# Patient Record
Sex: FEMALE | Race: WHITE | NOT HISPANIC OR LATINO | Employment: FULL TIME | URBAN - METROPOLITAN AREA
[De-identification: names, ages, dates, MRNs, and addresses within clinical notes are randomized per-mention and may not be internally consistent; named-entity substitution may affect disease eponyms.]

---

## 2023-07-27 ENCOUNTER — TELEPHONE (OUTPATIENT)
Dept: GASTROENTEROLOGY | Facility: CLINIC | Age: 63
End: 2023-07-27

## 2023-07-27 NOTE — TELEPHONE ENCOUNTER
Pt returned call and confirmed appt for 7/31/2023. Pt does not wish to schedule another appt at this time until seen on Monday.

## 2023-07-27 NOTE — TELEPHONE ENCOUNTER
Left a voice mail for the patient to call back and schedule her 2 to 3 week [place] discharge appt with RICKI Inman

## 2023-07-31 ENCOUNTER — TELEPHONE (OUTPATIENT)
Dept: GASTROENTEROLOGY | Facility: CLINIC | Age: 63
End: 2023-07-31

## 2023-07-31 ENCOUNTER — OFFICE VISIT (OUTPATIENT)
Dept: GASTROENTEROLOGY | Facility: CLINIC | Age: 63
End: 2023-07-31
Payer: COMMERCIAL

## 2023-07-31 VITALS
SYSTOLIC BLOOD PRESSURE: 100 MMHG | HEIGHT: 61 IN | WEIGHT: 118 LBS | BODY MASS INDEX: 22.28 KG/M2 | DIASTOLIC BLOOD PRESSURE: 70 MMHG

## 2023-07-31 DIAGNOSIS — Z87.19 HISTORY OF COLONIC DIVERTICULITIS: Primary | ICD-10-CM

## 2023-07-31 PROBLEM — Z12.11 COLON CANCER SCREENING: Status: ACTIVE | Noted: 2023-07-31

## 2023-07-31 PROCEDURE — 99204 OFFICE O/P NEW MOD 45 MIN: CPT | Performed by: PHYSICIAN ASSISTANT

## 2023-07-31 RX ORDER — ABALOPARATIDE 2000 UG/ML
INJECTION, SOLUTION SUBCUTANEOUS
COMMUNITY
Start: 2023-07-13

## 2023-07-31 RX ORDER — MULTIVITAMIN
1 TABLET ORAL DAILY
COMMUNITY

## 2023-07-31 RX ORDER — VITAMIN B COMPLEX
1 CAPSULE ORAL DAILY
COMMUNITY

## 2023-07-31 NOTE — TELEPHONE ENCOUNTER
Scheduled date of colonoscopy (as of today):10-05-23  Physician performing colonoscopy:ayan  Location of colonoscopy:[facility]  Bowel prep reviewed with patient:Clenpiq  SAMPLE GIVEN  Instructions reviewed with patient by:lemuel  Clearances: no

## 2023-07-31 NOTE — PATIENT INSTRUCTIONS
Fiber Supplementation Instructions     Insufficient fiber in the diet can lead to constipation which may result in the development of hemorrhoids and/or anal fissures. Increased fiber intake has been shown to reduce constipation by softening the stool and increasing the regularity of bowel movements. Soluble fiber (such as Benefiber®) is easy to incorporate into your diet as it is tasteless and can be mixed with food or drink. Begin with 1 tbsp once a day and then gradually increase to 1 tbsp 3 times a day over the period of a few days. Stir Benefiber® into 4-8 ounces of liquid (carbonated beverages are not recommended) or mix with soft food (hot or cold). Stir well until dissolved. Increase your fluid intake as necessary to ensure you are drinking 7-8 glasses of water every day. Supplemental fiber should be taken with meals for greatest benefit.  Many less-expensive generic versions of Benefiber® are available with similar active components (pictured below)

## 2023-07-31 NOTE — PROGRESS NOTES
[address] [place] [address] [facility] - Outpatient Follow-up Note  Rani Snell 61 y.o. female MRN: [id number]  Encounter: [id number]    ASSESSMENT AND PLAN:    1. History of colonic diverticulitis  · Acute episode resolved, overall feeling better. · Recommend colonoscopy to r/o colonic neoplasm that could contribute to acute diverticulitis episode. Procedure risks and preparation discussed in detail. No contraindications to perform procedure at [facility]. · Slowly continue to advance diet to high fiber - handout provided. Discussed role of supplementary fiber  Patient instructed to return if symptoms worsen or fail to improve. - Colonoscopy; Future  - sodium picosulfate, magnesium oxide, citric acid (Clenpiq) oral solution; Take 175 mL (1 bottle) the evening before the colonoscopy, between 5 PM and 9 PM, followed by a second 175 mL bottle 5 hours before the colonoscopy. Dispense: 350 mL; Refill: 0      Follow up appointment: for colonoscopy  ______________________________________________________________________    Chief Complaint   Patient presents with   • f/u diverticulitis       HPI:   Accompanied by self and history is obtained from self. Patient is a 61 y.o. female with a significant PMH of diverticulitis, GERD, hypothyroidism presenting for follow up regarding [address] [place] [address] admission 7/21-25 for second episode of acute diverticulitis. HPI    Admitted to [address] [place] [address] 7/21-25 for L-sided abdominal pain  CT A/P showed "acute diverticulitis in mid descending colon with surrounding stranding and small amount of fluid extending posterior medially into the lateral aspect of left Gerota's fascia"  Started on IV Zosyn in the hospital, d/c to home on Augmentin for 6 additional days to complete 10 total days of abx - last dose today 7/31  Last colonoscopy 2015    Overall, feeling better.  Still somewhat tired  Some gas pains this AM - intermittent since home  Improved after loose BM today  BMs have been soft - no constipation  Is adhering to low-residue diet and tolerating that well to date  Occasional nausea, resolves on its own  Has started adding psyllium husk in oatmeal    GI History:  Previous issues: as above  Blood thinners: ASA: no antiplatelet: no anticoagulation: no  Insulin use: no    Abdominal Surgical Hx: liver bx 7/24/23 - pathology pending  Family Hx: Denies first degree relatives with GI malignancies. GI procedure Hx:  EGD: 10+ yrs ago  Colonoscopy: 2015  GI imaging Hx: as above    Review of Systems   Constitutional: Negative for appetite change, fever and unexpected weight change. HENT: Negative for dental problem, mouth sores, sore throat, trouble swallowing and voice change. Respiratory: Negative for cough and choking. Cardiovascular: Negative for chest pain and leg swelling. Gastrointestinal: Positive for abdominal pain (intermittent, overall improving) and nausea (intermittent). Negative for abdominal distention, anal bleeding, blood in stool, constipation, diarrhea, rectal pain and vomiting. Genitourinary: Negative for dysuria, frequency and hematuria. Skin: Negative for pallor and rash. Neurological: Positive for headaches (migraines). Negative for weakness and light-headedness. Psychiatric/Behavioral: Negative for confusion. The patient is not nervous/anxious.         Historical Information   Past Medical History:   Diagnosis Date   • Diverticulitis of colon 07/21/2023     Past Surgical History:   Procedure Laterality Date   • COLONOSCOPY  2015   • LIVER BIOPSY  07/24/2023     Social History     Substance and Sexual Activity   Alcohol Use Yes   • Alcohol/week: 7.0 standard drinks of alcohol   • Types: 5 Cans of beer, 2 Standard drinks or equivalent per week    Comment: May have a beer after work or a drink on the weekends     Social History     Substance and Sexual Activity   Drug Use Not Currently   • Types: Hashish, Marijuana    Comment: Used to smoke in my younger days Social History     Tobacco Use   Smoking Status Former   • Packs/day: 1.00   • Years: 40.00   • Total pack years: 40.00   • Types: Cigarettes   • Start date: 1975   • Quit date: 2015   • Years since quittin.1   Smokeless Tobacco Never   Tobacco Comments    Started as a kid (thought it was cool) got hooked. No family history on file. Current Outpatient Medications:   •  5-Hydroxytryptophan 50 MG CAPS  •  Ascorbic Acid 500 MG CHEW  •  Tymlos 3120 MCG/1.56ML SOPN  Allergies   Allergen Reactions   • Ciprofloxacin Diarrhea     Reviewed medications and allergies and updated as indicated    PHYSICAL EXAM:    Blood pressure 100/70, height 5' 1" (1.549 m), weight 53.5 kg (118 lb). Body mass index is 22.3 kg/m². Physical Exam  Vitals and nursing note reviewed. Constitutional:       General: She is not in acute distress. Appearance: She is not toxic-appearing. HENT:      Head: Normocephalic. Mouth/Throat:      Mouth: Mucous membranes are moist.      Pharynx: Oropharynx is clear. Eyes:      General: No scleral icterus. Extraocular Movements: Extraocular movements intact. Neck:      Trachea: Phonation normal.   Cardiovascular:      Rate and Rhythm: Normal rate and regular rhythm. Heart sounds: No murmur heard. No friction rub. No gallop. Pulmonary:      Effort: Pulmonary effort is normal. No respiratory distress. Breath sounds: No wheezing, rhonchi or rales. Abdominal:      General: Abdomen is flat. Bowel sounds are normal. There is no distension or abdominal bruit. Palpations: Abdomen is soft. There is no hepatomegaly or splenomegaly. Tenderness: There is abdominal tenderness (minimally) in the left lower quadrant. There is no guarding or rebound. Musculoskeletal:      Cervical back: Neck supple. Right lower leg: No edema. Left lower leg: No edema.       Comments: Moving all 4 extremities spontaneously   Skin:     General: Skin is warm and dry.      Capillary Refill: Capillary refill takes less than 2 seconds. Coloration: Skin is not jaundiced or pale. Neurological:      General: No focal deficit present. Mental Status: She is alert and oriented to person, place, and time. Psychiatric:         Behavior: Behavior normal. Behavior is cooperative. Thought Content: Thought content normal.         Lab Results:   Reviewed records from outside hospitalization. Radiology Results:   No results found. Patient expressed understanding and had all questions and concerns addressed. Ankit Lunsford PA-C  07/31/23  2:05 PM    This chart was completed in part utilizing [employer] speech voice recognition software. Random word insertions, pronoun errors, and incomplete sentences are an occasional consequence of this system due to software limitations, and ambient noise. Any questions or concerns about the content, text, or information contained within the body of this dictation should be directly addressed to the provider for clarification.

## 2023-08-09 ENCOUNTER — NURSE TRIAGE (OUTPATIENT)
Age: 63
End: 2023-08-09

## 2023-08-09 DIAGNOSIS — K76.9 LIVER LESION: Primary | ICD-10-CM

## 2023-08-09 NOTE — TELEPHONE ENCOUNTER
Patient calling in, had liver biopsy done on 7/24 at [facility]- results came back benign on hepatic lesion. Patient would like to know what the next plan of care is, does she need further blood work or surveillance of lesion?

## 2023-09-27 ENCOUNTER — TELEPHONE (OUTPATIENT)
Dept: GASTROENTEROLOGY | Facility: CLINIC | Age: 63
End: 2023-09-27

## 2023-09-27 NOTE — TELEPHONE ENCOUNTER
Procedure confirmed  Colonoscopy     Via: [facility]    Instructions given: Given to Patient at Visit     Prep Given: Clenpiq    Call the office if there are any questions.

## 2023-10-05 ENCOUNTER — ANESTHESIA EVENT (OUTPATIENT)
Dept: GASTROENTEROLOGY | Facility: AMBULATORY SURGERY CENTER | Age: 63
End: 2023-10-05

## 2023-10-05 ENCOUNTER — HOSPITAL ENCOUNTER (OUTPATIENT)
Dept: GASTROENTEROLOGY | Facility: AMBULATORY SURGERY CENTER | Age: 63
Discharge: HOME | End: 2023-10-05
Payer: COMMERCIAL

## 2023-10-05 ENCOUNTER — ANESTHESIA (OUTPATIENT)
Dept: GASTROENTEROLOGY | Facility: AMBULATORY SURGERY CENTER | Age: 63
End: 2023-10-05

## 2023-10-05 VITALS
BODY MASS INDEX: 22.28 KG/M2 | WEIGHT: 118 LBS | OXYGEN SATURATION: 100 % | SYSTOLIC BLOOD PRESSURE: 119 MMHG | TEMPERATURE: 97.6 F | HEART RATE: 61 BPM | RESPIRATION RATE: 25 BRPM | HEIGHT: 61 IN | DIASTOLIC BLOOD PRESSURE: 70 MMHG

## 2023-10-05 DIAGNOSIS — Z87.19 HISTORY OF COLONIC DIVERTICULITIS: ICD-10-CM

## 2023-10-05 PROCEDURE — 45380 COLONOSCOPY AND BIOPSY: CPT | Performed by: INTERNAL MEDICINE

## 2023-10-05 PROCEDURE — 45385 COLONOSCOPY W/LESION REMOVAL: CPT | Performed by: INTERNAL MEDICINE

## 2023-10-05 PROCEDURE — 88305 TISSUE EXAM BY PATHOLOGIST: CPT | Performed by: STUDENT IN AN ORGANIZED HEALTH CARE EDUCATION/TRAINING PROGRAM

## 2023-10-05 RX ORDER — PROPOFOL 10 MG/ML
INJECTION, EMULSION INTRAVENOUS AS NEEDED
Status: DISCONTINUED | OUTPATIENT
Start: 2023-10-05 | End: 2023-10-05

## 2023-10-05 RX ORDER — THYROID 60 MG
60 TABLET ORAL DAILY
COMMUNITY
Start: 2023-10-03

## 2023-10-05 RX ORDER — SODIUM CHLORIDE, SODIUM LACTATE, POTASSIUM CHLORIDE, CALCIUM CHLORIDE 600; 310; 30; 20 MG/100ML; MG/100ML; MG/100ML; MG/100ML
50 INJECTION, SOLUTION INTRAVENOUS CONTINUOUS
Status: DISCONTINUED | OUTPATIENT
Start: 2023-10-05 | End: 2023-10-09 | Stop reason: HOSPADM

## 2023-10-05 RX ORDER — LIDOCAINE HYDROCHLORIDE 10 MG/ML
INJECTION, SOLUTION EPIDURAL; INFILTRATION; INTRACAUDAL; PERINEURAL AS NEEDED
Status: DISCONTINUED | OUTPATIENT
Start: 2023-10-05 | End: 2023-10-05

## 2023-10-05 RX ORDER — SODIUM CHLORIDE, SODIUM LACTATE, POTASSIUM CHLORIDE, CALCIUM CHLORIDE 600; 310; 30; 20 MG/100ML; MG/100ML; MG/100ML; MG/100ML
INJECTION, SOLUTION INTRAVENOUS CONTINUOUS PRN
Status: DISCONTINUED | OUTPATIENT
Start: 2023-10-05 | End: 2023-10-05

## 2023-10-05 RX ADMIN — SODIUM CHLORIDE, SODIUM LACTATE, POTASSIUM CHLORIDE, CALCIUM CHLORIDE: 600; 310; 30; 20 INJECTION, SOLUTION INTRAVENOUS at 10:04

## 2023-10-05 RX ADMIN — SODIUM CHLORIDE, SODIUM LACTATE, POTASSIUM CHLORIDE, CALCIUM CHLORIDE 50 ML/HR: 600; 310; 30; 20 INJECTION, SOLUTION INTRAVENOUS at 09:06

## 2023-10-05 RX ADMIN — PROPOFOL 50 MG: 10 INJECTION, EMULSION INTRAVENOUS at 10:15

## 2023-10-05 RX ADMIN — PROPOFOL 50 MG: 10 INJECTION, EMULSION INTRAVENOUS at 10:19

## 2023-10-05 RX ADMIN — PROPOFOL 50 MG: 10 INJECTION, EMULSION INTRAVENOUS at 10:12

## 2023-10-05 RX ADMIN — PROPOFOL 100 MG: 10 INJECTION, EMULSION INTRAVENOUS at 10:10

## 2023-10-05 RX ADMIN — PROPOFOL 50 MG: 10 INJECTION, EMULSION INTRAVENOUS at 10:23

## 2023-10-05 RX ADMIN — PROPOFOL 50 MG: 10 INJECTION, EMULSION INTRAVENOUS at 10:27

## 2023-10-05 RX ADMIN — LIDOCAINE HYDROCHLORIDE 100 MG: 10 INJECTION, SOLUTION EPIDURAL; INFILTRATION; INTRACAUDAL; PERINEURAL at 10:10

## 2023-10-05 NOTE — ANESTHESIA PREPROCEDURE EVALUATION
Procedure:  COLONOSCOPY    Relevant Problems   No relevant active problems        Physical Exam    Airway    Mallampati score: II  TM Distance: >3 FB  Neck ROM: full     Dental   No notable dental hx     Cardiovascular      Pulmonary      Other Findings        Anesthesia Plan  ASA Score- 2     Anesthesia Type- IV sedation with anesthesia with ASA Monitors. Additional Monitors:   Airway Plan:           Plan Factors-    Chart reviewed. Patient summary reviewed. Patient is not a current smoker. Induction- intravenous. Postoperative Plan-     Informed Consent- Anesthetic plan and risks discussed with patient. I personally reviewed this patient with the CRNA. Discussed and agreed on the Anesthesia Plan with the CRNA. Jillian Javier

## 2023-10-05 NOTE — H&P
History and Physical - [facility]  Najma Rodriges 61 y.o. female MRN: [id number]    HPI: Najma Rodriges is a 61y.o. year old female who presents for colonoscopy for evaluation after episode of acute diverticulitis and also personal history of polyps    REVIEW OF SYSTEMS: Per the HPI, and otherwise unremarkable. Historical Information   Past Medical History:   Diagnosis Date   • Diverticulitis of colon 2023   • Osteoporosis      Past Surgical History:   Procedure Laterality Date   • BACK SURGERY     • COLONOSCOPY     • LIVER BIOPSY  2023   • PATELLA SURGERY Right    • TIBIA FRACTURE SURGERY Right      Social History   Social History     Substance and Sexual Activity   Alcohol Use Yes   • Alcohol/week: 7.0 standard drinks of alcohol   • Types: 5 Cans of beer, 2 Standard drinks or equivalent per week    Comment: May have a beer after work or a drink on the weekends     Social History     Substance and Sexual Activity   Drug Use Not Currently   • Types: Hashish, Marijuana    Comment: Used to smoke in my younger days     Social History     Tobacco Use   Smoking Status Former   • Packs/day: 1.00   • Years: 40.00   • Total pack years: 40.00   • Types: Cigarettes   • Start date: 1975   • Quit date: 2015   • Years since quittin.3   Smokeless Tobacco Never   Tobacco Comments    Started as a kid (thought it was cool) got hooked. History reviewed. No pertinent family history.     Meds/Allergies       Current Outpatient Medications:   •  [place] Thyroid 60 MG tablet  •  sodium picosulfate, magnesium oxide, citric acid (Clenpiq) oral solution  •  Tymlos 3120 MCG/1.56ML SOPN  •  5-Hydroxytryptophan 50 MG CAPS  •  Ascorbic Acid 500 MG CHEW  •  b complex vitamins capsule  •  Calcium Carbonate-Vit D-Min (CALCIUM 1200 PO)  •  Eptinezumab-jjmr (VYEPTI IV)  •  Multiple Vitamin (multivitamin) tablet    Current Facility-Administered Medications:   •  lactated ringers infusion, 50 mL/hr, Intravenous, Continuous, 50 mL/hr at 10/05/23 1520    Allergies   Allergen Reactions   • Ciprofloxacin Diarrhea       Objective     /75   Pulse 77   Temp 97.6 °F (36.4 °C) (Temporal)   Resp 16   Ht 5' 1" (1.549 m)   Wt 53.5 kg (118 lb)   SpO2 100%   BMI 22.30 kg/m²     PHYSICAL EXAM    Gen: NAD AAOx3  Head: Normocephalic, Atraumatic  CV: S1S2 RRR no m/r/g  CHEST: Clear b/l no c/r/w  ABD: soft, +BS NT/ND  EXT: no edema    ASSESSMENT/PLAN:  This is a 61y.o. year old female here for colonoscopy, and she is stable and optimized for her procedure.

## 2023-10-05 NOTE — ANESTHESIA POSTPROCEDURE EVALUATION
Post-Op Assessment Note    CV Status:  Stable  Pain Score: 0    Pain management: adequate     Mental Status:  Alert and awake   Hydration Status:  Euvolemic   PONV Controlled:  Controlled   Airway Patency:  Patent      Post Op Vitals Reviewed: Yes      Staff: Anesthesiologist, CRNA         There were no known notable events for this encounter.     /68 (10/05/23 1035)    Temp      Pulse 74 (10/05/23 1035)   Resp (!) 24 (10/05/23 1035)    SpO2 100 % (10/05/23 1035)

## 2023-10-10 PROCEDURE — 88305 TISSUE EXAM BY PATHOLOGIST: CPT | Performed by: STUDENT IN AN ORGANIZED HEALTH CARE EDUCATION/TRAINING PROGRAM

## 2023-10-15 NOTE — RESULT ENCOUNTER NOTE
To: Sheridan Hyatt    Two of the polyps removed during your colonoscopy were adenomas, which are "precancerous", but completely benign. Repeat colonoscopy is recommended in 5 years.   Best regards,    Nikita Jenkins MD

## 2024-02-05 ENCOUNTER — HOSPITAL ENCOUNTER (OUTPATIENT)
Dept: MRI IMAGING | Facility: HOSPITAL | Age: 64
Discharge: HOME | End: 2024-02-05
Payer: COMMERCIAL

## 2024-02-05 DIAGNOSIS — K76.9 LIVER LESION: ICD-10-CM

## 2024-02-05 PROCEDURE — A9585 GADOBUTROL INJECTION: HCPCS | Performed by: RADIOLOGY

## 2024-02-05 PROCEDURE — 74183 MRI ABD W/O CNTR FLWD CNTR: CPT

## 2024-02-05 PROCEDURE — G1004 CDSM NDSC: HCPCS

## 2024-02-05 RX ORDER — GADOBUTROL 604.72 MG/ML
6 INJECTION INTRAVENOUS
Status: COMPLETED | OUTPATIENT
Start: 2024-02-05 | End: 2024-02-05

## 2024-02-05 RX ADMIN — GADOBUTROL 6 ML: 604.72 INJECTION INTRAVENOUS at 16:27

## 2024-02-19 ENCOUNTER — TELEPHONE (OUTPATIENT)
Age: 64
End: 2024-02-19

## 2024-02-19 ENCOUNTER — TELEPHONE (OUTPATIENT)
Dept: GASTROENTEROLOGY | Facility: CLINIC | Age: 64
End: 2024-02-19

## 2024-02-19 NOTE — TELEPHONE ENCOUNTER
Reviewed provider recommendations. States she had a kidney lesion previously. Pt reports she has longstanding lower left back (intermittent) pain and questioning if it could be related. Also asking if new order will entered and whether it will be with contrast. Pt declining OV at this time.     Pt can be reached at [phone] or via [email or web address] if unable to connect with her.

## 2024-02-19 NOTE — TELEPHONE ENCOUNTER
Nai from the [facility] calling in to speak to the clinical team regarding an order. Nai was transferred over to the office and roma took over the call.

## 2024-02-19 NOTE — TELEPHONE ENCOUNTER
Dr. Jackson from radiology needed to speak with Frieda.  Call back info given to ELLEN Javier to contact him.

## 2024-02-19 NOTE — TELEPHONE ENCOUNTER
Received [employer] Text re: request to speak with radiology reading room. I spoke with Dr. Jackson, radiologist.    Hepatic mass is stable, probably HCC vs adenoma - no significant change, no particularly concerning features.    New indeterminate lesion on L kidney - questionable small RCC vs could be from frequent blood products.    Radiology recommending 6 month follow up MRI abdomen.    Please call patient with the above information - my calls are not going through. Feel free to offer office visit with me to discuss in more detail if patient has further questions.    Please obtain hepatic biopsy report from [facility].

## 2024-10-31 ENCOUNTER — TELEPHONE (OUTPATIENT)
Age: 64
End: 2024-10-31

## 2024-10-31 NOTE — TELEPHONE ENCOUNTER
Dr Durand's office called to find out if pt had been seen since her colonoscopy. Advised pt has not had any OV's and does not have anything scheduled at this time

## 2024-11-02 ENCOUNTER — PATIENT MESSAGE (OUTPATIENT)
Dept: GASTROENTEROLOGY | Facility: CLINIC | Age: 64
End: 2024-11-02

## 2024-11-05 ENCOUNTER — TELEPHONE (OUTPATIENT)
Age: 64
End: 2024-11-05

## 2024-11-05 NOTE — TELEPHONE ENCOUNTER
Blanca from Dr. Anderson office contacted office requesting to speak with Frieda De La Garza regarding recent MRI patient completed. Transferred call to nurse triage to further assist with concerns.

## 2024-11-05 NOTE — PATIENT COMMUNICATION
Blanca Bucky BARNES calling.  States pt had an MRI of liver which showed significant growth of the liver mass in the right lobe of liver and wants pt to follow up with GI.  MRI was done at [place] and she will fax report to office.      CB if needed for Blanca is [phone]- office #- she is there until 5 today.

## 2024-11-15 ENCOUNTER — DOCUMENTATION (OUTPATIENT)
Dept: HEMATOLOGY ONCOLOGY | Facility: CLINIC | Age: 64
End: 2024-11-15

## 2024-11-15 NOTE — PROGRESS NOTES
In-basket message received from Frieda De La Garza PA-C to add patient to the liver MDCC on 11/22/2024. Chart reviewed and prep completed.

## 2024-11-22 ENCOUNTER — DOCUMENTATION (OUTPATIENT)
Dept: HEMATOLOGY ONCOLOGY | Facility: CLINIC | Age: 64
End: 2024-11-22

## 2024-11-22 NOTE — PROGRESS NOTES
LIVER MULTIDISCIPLINARY CANCER CONFERENCE    DATE: 11/22/2024      PRESENTING DOCTOR: Frieda De La Garza PA-C      DIAGNOSIS: Liver lesion      Margie Cesar is a 64 y.o. was presented at the Liver Multidisciplinary Cancer Conference today.      PHYSICIAN RECOMMENDED PLAN:    -No recommendations at this time unless patient becomes symptomatic, then consider intervention with MRI EOVIST study.     Team agreed to plan.    The final treatment plan will be left to the discretion of the patient and the treating physician.     DISCLAIMERS:  TO THE TREATING PHYSICIAN:  This conference is a meeting of clinicians from various specialty areas who evaluate and discuss patients for whom a multidisciplinary treatment approach is being considered. Please note that the above opinion was a consensus of the conference attendees and is intended only to assist in quality care of the discussed patient.  The responsibility for follow up on the input given during the conference, along with any final decisions regarding plan of care, is that of the patient and the patient's provider. Accordingly, appointments have only been recommended based on this information and have NOT been scheduled unless otherwise noted.      TO THE PATIENT:  This summary is a brief record of major aspects of your cancer treatment. You may choose to share a copy with any of your doctors or nurses. However, this is not a detailed or comprehensive record of your care.      NCCN guidelines were readily available for review at this discussion